# Patient Record
Sex: MALE | Race: BLACK OR AFRICAN AMERICAN | NOT HISPANIC OR LATINO | URBAN - NONMETROPOLITAN AREA
[De-identification: names, ages, dates, MRNs, and addresses within clinical notes are randomized per-mention and may not be internally consistent; named-entity substitution may affect disease eponyms.]

---

## 2018-02-08 ENCOUNTER — OFFICE VISIT CONVERTED (OUTPATIENT)
Dept: FAMILY MEDICINE CLINIC | Age: 33
End: 2018-02-08
Attending: NURSE PRACTITIONER

## 2018-03-19 ENCOUNTER — OFFICE VISIT CONVERTED (OUTPATIENT)
Dept: FAMILY MEDICINE CLINIC | Age: 33
End: 2018-03-19
Attending: NURSE PRACTITIONER

## 2018-06-05 ENCOUNTER — OFFICE VISIT CONVERTED (OUTPATIENT)
Dept: FAMILY MEDICINE CLINIC | Age: 33
End: 2018-06-05
Attending: NURSE PRACTITIONER

## 2018-09-13 ENCOUNTER — OFFICE VISIT CONVERTED (OUTPATIENT)
Dept: FAMILY MEDICINE CLINIC | Age: 33
End: 2018-09-13
Attending: NURSE PRACTITIONER

## 2019-01-03 ENCOUNTER — OFFICE VISIT CONVERTED (OUTPATIENT)
Dept: FAMILY MEDICINE CLINIC | Age: 34
End: 2019-01-03
Attending: NURSE PRACTITIONER

## 2019-07-17 ENCOUNTER — HOSPITAL ENCOUNTER (OUTPATIENT)
Dept: OTHER | Facility: HOSPITAL | Age: 34
Discharge: HOME | End: 2019-07-17

## 2019-07-17 ENCOUNTER — OFFICE VISIT CONVERTED (OUTPATIENT)
Dept: FAMILY MEDICINE CLINIC | Age: 34
End: 2019-07-17
Attending: NURSE PRACTITIONER

## 2019-07-17 LAB
ALBUMIN SERPL-MCNC: 4.4 G/DL (ref 3.5–5)
ALBUMIN/GLOB SERPL: 1.2 {RATIO} (ref 1.4–2.6)
ALP SERPL-CCNC: 71 U/L (ref 53–128)
ALT SERPL-CCNC: 14 U/L (ref 10–40)
ANION GAP SERPL CALC-SCNC: 19 MMOL/L (ref 8–19)
AST SERPL-CCNC: 15 U/L (ref 15–50)
BASOPHILS # BLD MANUAL: 0.03 10*3/UL (ref 0–0.2)
BASOPHILS NFR BLD MANUAL: 0.6 % (ref 0–3)
BILIRUB SERPL-MCNC: 0.63 MG/DL (ref 0.2–1.3)
BUN SERPL-MCNC: 14 MG/DL (ref 5–25)
BUN/CREAT SERPL: 12 {RATIO} (ref 6–20)
CALCIUM SERPL-MCNC: 9.3 MG/DL (ref 8.7–10.4)
CHLORIDE SERPL-SCNC: 100 MMOL/L (ref 99–111)
CHOLEST SERPL-MCNC: 137 MG/DL (ref 107–200)
CHOLEST/HDLC SERPL: 4.6 {RATIO} (ref 3–6)
CONV CO2: 26 MMOL/L (ref 22–32)
CONV TOTAL PROTEIN: 8.2 G/DL (ref 6.3–8.2)
CREAT UR-MCNC: 1.17 MG/DL (ref 0.7–1.2)
DEPRECATED RDW RBC AUTO: 36.9 FL
EOSINOPHIL # BLD MANUAL: 0.09 10*3/UL (ref 0–0.7)
EOSINOPHIL NFR BLD MANUAL: 1.7 % (ref 0–7)
ERYTHROCYTE [DISTWIDTH] IN BLOOD BY AUTOMATED COUNT: 11.7 % (ref 11.5–14.5)
GFR SERPLBLD BASED ON 1.73 SQ M-ARVRAT: >60 ML/MIN/{1.73_M2}
GLOBULIN UR ELPH-MCNC: 3.8 G/DL (ref 2–3.5)
GLUCOSE SERPL-MCNC: 121 MG/DL (ref 70–99)
GRANS (ABSOLUTE): 2.5 10*3/UL (ref 2–8)
GRANS: 46.3 % (ref 30–85)
HBA1C MFR BLD: 16.9 G/DL (ref 14–18)
HCT VFR BLD AUTO: 50.3 % (ref 42–52)
HDLC SERPL-MCNC: 30 MG/DL (ref 40–60)
IMM GRANULOCYTES # BLD: 0 10*3/UL (ref 0–0.54)
IMM GRANULOCYTES NFR BLD: 0 % (ref 0–0.43)
LDLC SERPL CALC-MCNC: 91 MG/DL (ref 70–100)
LYMPHOCYTES # BLD MANUAL: 2.46 10*3/UL (ref 1–5)
LYMPHOCYTES NFR BLD MANUAL: 5.8 % (ref 3–10)
MCH RBC QN AUTO: 28.9 PG (ref 27–31)
MCHC RBC AUTO-ENTMCNC: 33.6 G/DL (ref 33–37)
MCV RBC AUTO: 86 FL (ref 80–96)
MONOCYTES # BLD AUTO: 0.31 10*3/UL (ref 0.2–1.2)
OSMOLALITY SERPL CALC.SUM OF ELEC: 294 MOSM/KG (ref 273–304)
PLATELET # BLD AUTO: 312 10*3/UL (ref 130–400)
PMV BLD AUTO: 10.3 FL (ref 7.4–10.4)
POTASSIUM SERPL-SCNC: 3.7 MMOL/L (ref 3.5–5.3)
RBC # BLD AUTO: 5.85 10*6/UL (ref 4.7–6.1)
SODIUM SERPL-SCNC: 141 MMOL/L (ref 135–147)
TRIGL SERPL-MCNC: 78 MG/DL (ref 40–150)
TSH SERPL-ACNC: 1.04 M[IU]/L (ref 0.27–4.2)
VARIANT LYMPHS NFR BLD MANUAL: 45.6 % (ref 20–45)
VLDLC SERPL-MCNC: 16 MG/DL (ref 5–37)
WBC # BLD AUTO: 5.39 10*3/UL (ref 4.8–10.8)

## 2019-11-06 ENCOUNTER — OFFICE VISIT CONVERTED (OUTPATIENT)
Dept: CARDIOLOGY | Facility: CLINIC | Age: 34
End: 2019-11-06
Attending: INTERNAL MEDICINE

## 2020-01-14 ENCOUNTER — OFFICE VISIT CONVERTED (OUTPATIENT)
Dept: FAMILY MEDICINE CLINIC | Age: 35
End: 2020-01-14
Attending: FAMILY MEDICINE

## 2021-05-15 VITALS
HEART RATE: 81 BPM | DIASTOLIC BLOOD PRESSURE: 111 MMHG | WEIGHT: 241 LBS | SYSTOLIC BLOOD PRESSURE: 173 MMHG | BODY MASS INDEX: 33.74 KG/M2 | HEIGHT: 71 IN

## 2021-05-18 NOTE — PROGRESS NOTES
Obed Gil 1985     Office/Outpatient Visit    Visit Date: Wed, Jul 17, 2019 08:33 am    Provider: Mehreen Can N.P. (Assistant: Sandhya Odell LPN)    Location: [facility]        Electronically signed by Mehreen Can N.P. on  07/22/2019 02:21:14 PM                             SUBJECTIVE:        CC:     Mr. Gil is a 33 year old Black or  male.  needs FMLA renewed. Has a skin tag he wants to see about having removed. Has other personal things he needs to discuss and talk about with provider. BP is elevated this am, pt stated he has not had his bp medication yet today.          HPI: Patient also seen by ARNEL Mtz student.         PHQ-9 Depression Screening: Completed form scanned and in chart; Total Score 17 Alcohol Consumption Screening: Completed form scanned and in chart; Total Score 1         Depression with anxiety details; patient presents today for evaluation of depression.  Patient states he has increased stressed in his life currently with a recent break-up and his ill father. He states he has lost interest in his daily activities, has a hard time going to work and functioning. He feels as though he has racing thoughts and is unable to focus at times. He denies thoughts of harming himself or suicide.         Additionally, he presents with history of hypertension.  patient here for follow-up on his hypertension.  He states he has been taking his medication this week every day and has noticed an improvement in his blood pressure. Reports frequent headaches, denies blurry vision, swelling in ankles and dizziness.     ROS:     CONSTITUTIONAL:  Negative for chills, fatigue and fever.      CARDIOVASCULAR:  Negative for chest pain, orthopnea, paroxysmal nocturnal dyspnea and pedal edema.      RESPIRATORY:  Negative for dyspnea and cough.      GASTROINTESTINAL:  Negative for abdominal pain, heartburn, constipation, diarrhea, and stool changes.       NEUROLOGICAL:  Positive for headaches.   Negative for dizziness, memory loss or vertigo.      PSYCHIATRIC:  Negative for anxiety and depression.          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          Alcohol: Frequency: Socially When he drinks, the average quantity of alcohol is 2 drinks.          Substance Abuse History:     Last Reviewed on 1/03/2019 12:14 PM by Mehreen Can    NEGATIVE         Mental Health History:     Last Reviewed on 1/03/2019 12:14 PM by Mehreen Can    NEGATIVE         Communicable Diseases (eg STDs):     Last Reviewed on 1/03/2019 12:14 PM by Mehreen Can            Current Problems:     Last Reviewed on 1/03/2019 12:14 PM by Mehreen Can    Stress     Hyperlipidemia     Hypertension     ADD     Obstructive sleep apnea         Immunizations:     None        Allergies:     Last Reviewed on 1/03/2019 12:14 PM by Mehreen Can    Lisinopril: cough        Current Medications:     Last Reviewed on 1/03/2019 12:14 PM by Mehreen Can    Amlodipine  5mg Tablet 1 tab daily     Chlorthalidone 25mg Tablet Take 1 tablet(s) by mouth daily     Losartan 25mg Tablet 1/2 tab daily         OBJECTIVE:        Vitals:         Current: 7/17/2019 8:41:39 AM    Ht:  5 ft, 10.5 in;  Wt: 240 lbs;  BMI:  33.9    T: 97.7 F (oral);  BP: 149/95 mm Hg (left arm, sitting);  P: 71 bpm (left arm (BP Cuff), sitting);  sCr: 1.16 mg/dL;  GFR: 98.06        Exams:     PHYSICAL EXAM:     GENERAL: Vitals recorded well developed, well nourished;  well groomed;  no apparent distress;     EYES: lids and lacrimal system are normal in appearance; extraocular movements intact; conjunctiva and cornea are normal; PERRLA;     E/N/T:  normal EACs, TMs, nasal/oral mucosa, teeth, gingiva, and oropharynx;     NECK:  supple, full ROM; no thyromegaly; no carotid bruits;     RESPIRATORY: normal respiratory rate and pattern with no distress; normal breath sounds with no rales, rhonchi, wheezes or rubs;     CARDIOVASCULAR: normal rate; rhythm is regular;  normal S1; normal S2; no systolic murmur; no cyanosis; no edema;     GASTROINTESTINAL: nontender, nondistended; no hepatosplenomegaly or masses; no bruits;     SKIN:  no significant rashes or lesions; no suspicious moles;     MUSCULOSKELETAL:  Normal range of motion, strength and tone;     NEUROLOGICAL:  cranial nerves, motor and sensory function, reflexes, gait and coordination are all intact;     PSYCHIATRIC:  appropriate affect and demeanor; normal speech pattern; grossly normal memory;         ASSESSMENT:           V79.0   Z13.31  Screening for depression              DDx:     300.4   F34.1  Depression with anxiety              DDx:     401.1   I10  Hypertension              DDx:         ORDERS:         Meds Prescribed:       Prozac (Fluoxetine) 20mg Capsules 1 capsule daily  #30 (Thirty) capsule(s) Refills: 0         Lab Orders:       [id number]  [facility] PHYSICAL: CMP, CBC, TSH, LIPID: [id number], [id number], [id number], [id number]  (Send-Out)         APPTO  Appointment need  (In-House)           Procedures Ordered:       REFER  Referral to Specialist or Other Facility  (Send-Out)                   PLAN:          Depression with anxiety         REFERRALS:  Referral initiated to a psychiatrist ( Juliet Coreas ).       FOLLOW-UP: Advised to call if there is no improvement.            Prescriptions:       Prozac (Fluoxetine) 20mg Capsules 1 capsule daily  #30 (Thirty) capsule(s) Refills: 0           Orders:       REFER  Referral to Specialist or Other Facility  (Send-Out)            Hypertension     LABORATORY:  Labs ordered to be performed today include PHYSICAL PANEL; CMP, CBC, TSH, LIPID.      FOLLOW-UP: Schedule a follow-up visit in 6 months.            Orders:       [id number]  [facility] PHYSICAL: CMP, CBC, TSH, LIPID: [id number], [id number], [id number], [id number]  (Send-Out)         APPTO  Appointment need  (In-House)               Patient Recommendations:        For  Hypertension:     Schedule a follow-up visit in 6 months.              CHARGE CAPTURE:           Primary Diagnosis:     V79.0 Screening for depression            Z13.31    Encounter for screening for depression              Orders:          [id number]   Office/outpatient visit; established patient, level 3  (In-House)           300.4 Depression with anxiety            F34.1    Dysthymic disorder    401.1 Hypertension            I10    Essential (primary) hypertension              Orders:          APPTO   Appointment need  (In-House)

## 2021-05-18 NOTE — PROGRESS NOTES
Obed Gil 1985     Office/Outpatient Visit    Visit Date: Mon, Mar 19, 2018 06:35 pm    Provider: Chung Montaño N.P. (Assistant: Whitney Coreas MA)    Location: [facility]        Electronically signed by Chung Montaño N.P. on  03/20/2018 09:48:49 AM                             SUBJECTIVE:        CC:     Mr. Gil is a 32 year old Black or  male.  This is a follow-up visit.  pt states bp been running high, pt complains of headache, pt states he has been out of bp med for a couple days         HPI:         Patient presents with hypertension.  This was first diagnosed 10 yrs years ago.  Current nonpharmacologic treatment includes exercise.  His current cardiac medication regimen includes a diuretic and a calcium channel blocker.  He has not kept a blood pressure diary, but states that pressures have been too high.  He is tolerating the medication well without side effects.  Compliance with treatment has been fair; he does not follow a diet and exercise regimen.      ROS:     CONSTITUTIONAL:  Negative for chills, fatigue, fever and weight change.      CARDIOVASCULAR:  Positive for Been out of medication for about 2 days, BP normally 150 -160s.   Negative for chest pain, orthopnea, paroxysmal nocturnal dyspnea or pedal edema.      RESPIRATORY:  Negative for dyspnea and cough.      GASTROINTESTINAL:  Negative for abdominal pain, heartburn, constipation, diarrhea, and stool changes.      PSYCHIATRIC:  Negative for anxiety and depression.          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          Alcohol: Frequency: Socially When he drinks, the average quantity of alcohol is 2 drinks.              Current Problems:     Last Reviewed on 3/19/2018 07:05 PM by Chung Montaño    Hypertension     ADD     Obstructive sleep apnea     Cough         Immunizations:     None        Allergies:     Last Reviewed on 3/19/2018 07:05 PM by Chung Montaño    Lisinopril: cough        Current Medications:     Last Reviewed on 3/19/2018 07:05 PM by Chung Montaño    Chlorthalidone 25mg Tablet Take 1 tablet(s) by mouth daily     Amlodipine  2.5mg Tablet 1 tab daily         OBJECTIVE:        Vitals:         Current: 3/19/2018 6:37:28 PM    Ht:  5 ft, 10.5 in;  Wt: 249.5 lbs;  BMI: 35.3    T: 97.3 F (oral);  BP: 161/111 mm Hg (left arm, sitting);  P: 75 bpm (left arm (BP Cuff), sitting);  sCr: 1.16 mg/dL;  GFR: 100.61        Repeat:     6:47:33 PM     BP:   154/96mm Hg (right arm, sitting)         Exams:     PHYSICAL EXAM:     GENERAL: Vitals recorded well developed, well nourished;  well groomed;  no apparent distress;     RESPIRATORY: normal respiratory rate and pattern with no distress; normal breath sounds with no rales, rhonchi, wheezes or rubs;     CARDIOVASCULAR: normal rate; rhythm is regular;  normal S1; normal S2; no systolic murmur; no cyanosis; no edema;     GASTROINTESTINAL: nontender, nondistended; no hepatosplenomegaly or masses; no bruits;         ASSESSMENT:           401.1   I10  Hypertension              DDx:     272.4   E78.4  Hyperlipidemia              DDx:         ORDERS:         Meds Prescribed:       Refill of: Chlorthalidone 25mg Tablet Take 1 tablet(s) by mouth daily  #30 (Thirty)  tablet(s) Refills: 1       Refill of: Amlodipine  5mg Tablet 1 tab daily  #30 (Thirty) tablet(s) Refills: 1       Losartan 25mg Tablet 1/2 tab daily  #30 (Thirty) tablet(s) Refills: 1         Lab Orders:       [id number]  [facility] CBC with 3 part diff  (Send-Out)         [id number]  COMP [facility] Comp. Metabolic Panel  (Send-Out)         30690  TSH - [facility] TSH  (Send-Out)         [id number]  [facility] Microablbumin, quantitative  (Send-Out)         32177  [facility] Lipid Panel  (Send-Out)                   PLAN:          Hypertension     LABORATORY:  Labs ordered to be performed today include CBC, Comprehensive metabolic panel, Microalbumin, and TSH.      RECOMMENDATIONS given include: Call 5 resting BPs in the next week call them into the office.      FOLLOW-UP: Schedule a follow-up appointment in 2 weeks..            Prescriptions: This is a increase in Amlodipine to 5mg and addition of Losartan 12.5mg orally daily dmt       Refill of: Chlorthalidone 25mg Tablet Take 1 tablet(s) by mouth daily  #30 (Thirty) tablet(s) Refills: 1       Refill of: Amlodipine  5mg Tablet 1 tab daily  #30 (Thirty) tablet(s) Refills: 1       Losartan 25mg Tablet 1/2 tab daily  #30 (Thirty) tablet(s) Refills: 1           Orders:       [id number]  [facility] CBC with 3 part diff  (Send-Out)         80209  [facility] Comp. Metabolic Panel  (Send-Out)         [id number]  TSH [facility] TSH  (Send-Out)         79479  [facility] Microablbumin, quantitative  (Send-Out)            Hyperlipidemia     LABORATORY:  Labs ordered to be performed today include lipid panel.            Orders:       [id number]  [facility] Lipid Panel  (Send-Out)               Patient Recommendations:        For  Hypertension:     Schedule a follow-up visit in 2 weeks.                APPOINTMENT INFORMATION:        Monday Tuesday Wednesday Thursday Friday Saturday Sunday            Time:___________________AM  PM   Date:_____________________             CHARGE CAPTURE:           Primary  Diagnosis:     401.1 Hypertension            I10    Essential (primary) hypertension              Orders:          [id number]   Office/outpatient visit; established patient, level 4  (In-House)           272.4 Hyperlipidemia            E78.4    Other hyperlipidemia

## 2021-05-18 NOTE — PROGRESS NOTES
Obed Gil 1985     Office/Outpatient Visit    Visit Date: Thu, Sep 13, 2018 11:23 am    Provider: Janay Laurent N.P. (Assistant: Annamaria Adams MA)    Location: [facility]        Electronically signed by Janay Laurent N.P. on  09/14/2018 03:01:53 PM                             SUBJECTIVE:        CC:     Mr. Gil is a 32 year old Black or  male.  He presents with sore throat, swollen neck & Right ear pain & weakness.  (PT HAS BEEN TAKING 25MG, THE WHOLE TAB, PT THOUGHT THAT WAS HOW IT WAS DIRECTED & NOW NEEDS A REFILL)         HPI:         He complains of a sore throat.  This has been noted for the past two days.  The discomfort occurs constantly.  He denies exposure to ill contacts.  He has already tried to relieve the symptoms with ibuprofen.      ROS:     CONSTITUTIONAL:  Positive for fatigue.   Negative for fever.      E/N/T:  Positive for sore throat.   Negative for ear pain or frequent rhinorrhea.      CARDIOVASCULAR:  Negative for chest pain, palpitations, tachycardia, orthopnea, and edema.      RESPIRATORY:  Negative for cough, dyspnea, and hemoptysis.      GASTROINTESTINAL:  Negative for abdominal pain, heartburn, constipation, diarrhea, and stool changes.      MUSCULOSKELETAL:  Negative for arthralgias, back pain, and myalgias.      NEUROLOGICAL:  Negative for dizziness, headaches, paresthesias, and weakness.          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          Alcohol: Frequency: Socially When he drinks, the average quantity of alcohol is 2 drinks.          Substance Abuse History:     Last Reviewed on 3/19/2018 07:05 PM by Chung Montaño    NEGATIVE         Mental Health History:     Last Reviewed on 3/19/2018 07:05 PM by Chung Montaño    NEGATIVE         Communicable Diseases (eg STDs):     Last Reviewed on 3/19/2018 07:05 PM by Chung Montaño            Current Problems:     Last Reviewed on 3/19/2018 07:05 PM by Chung Montaño    Hyperlipidemia     Hypertension     ADD     Obstructive sleep apnea         Immunizations:     None        Allergies:     Last Reviewed on 6/05/2018 04:14 PM by Annamaria Adams    Lisinopril: cough        Current Medications:     Last Reviewed on 6/05/2018 04:16 PM by Annamaria Adams    Amlodipine  5mg Tablet 1 tab daily     Chlorthalidone 25mg Tablet Take 1 tablet(s) by mouth daily     Losartan 25mg Tablet 1/2 tab daily         OBJECTIVE:        Vitals:         Current: 9/13/2018 11:29:54 AM    Ht:  5 ft, 10.5 in;  Wt: 254.6 lbs;  BMI: 36.0    T: 98.2 F (oral);  BP: 146/104 mm Hg (left arm, sitting);  P: 92 bpm (left arm (BP Cuff), sitting);  sCr: 1.16 mg/dL;  GFR: 101.48        Repeat:     11:40:58 AM     BP:   156/98mm Hg (right arm, sitting)         Exams:     PHYSICAL EXAM:     GENERAL:  well developed and nourished; appropriately groomed; in no apparent distress;     E/N/T: EARS: bilateral TMs are normal;  NOSE: normal nasal mucosa; OROPHARYNX: posterior pharynx shows 2+ tonsils, a posterior pharyngeal exudate, and erythematous anterior tonsillar pillars;     RESPIRATORY: normal respiratory rate and pattern with no distress; normal breath sounds with no rales, rhonchi, wheezes or rubs;     CARDIOVASCULAR: normal rate;  rhythm is regular;     LYMPHATIC: bilateral anterior cervical nodes ( 0.5 cm in size, tender, mobile );     MUSCULOSKELETAL:  Normal range of motion, strength and tone;     NEUROLOGIC: mental status: alert and oriented x 3; GROSSLY INTACT     PSYCHIATRIC:  appropriate affect and demeanor; normal speech pattern; grossly normal memory;         Lab/Test Results:             Rapid Strep Screen:  Positive (09/13/2018),     Performed by::  [facility] (09/13/2018),             ASSESSMENT           034.0   J02.0  Streptococcal pharyngitis              DDx:     401.1   I10  Hypertension              DDx:         ORDERS:         Meds Prescribed:       Amoxicillin 500mg Capsules 2 caps bid x 10 days  #40 (Forty) capsule(s) Refills: 0         Lab Orders:       [id number]  Group A Streptococcus detection by immunoassay with direct optical observation  (In-House)                   PLAN:          Streptococcal pharyngitis         RECOMMENDATIONS given include: rest, increase oral fluid intake, reduce fever with acetaminophen or ibuprofen, Salt water gargle, and get new toothbrush in a few days.  follow up if not improving..            Prescriptions:       Amoxicillin 500mg Capsules 2 caps bid x 10 days  #40 (Forty) capsule(s) Refills: 0           Orders:       [id number]  Group A Streptococcus detection by immunoassay with direct optical observation  (In-House)            Hypertension         on amlodipine, chlorthalidone. and losartan.  bp high today.  he does not feel as if his bp has been elevated.  come for free bp check in one week.  losartan refill request sent to PCP.              Patient Recommendations:        For  Streptococcal pharyngitis:     Get plenty of rest. Increase oral fluid intake. Reduce fever as needed with acetaminophen (Tylenol) or ibuprofen (Motrin, Advil, etc.). Make salt water solution by combining 1/2 to 1 teaspoons of table salt with 8 ounces of warm water.  Gargle for 10 seconds and spit out salt water.  Repeat  several times a day as needed.              CHARGE CAPTURE           **Please note: ICD descriptions below are intended for billing purposes only and may not represent clinical diagnoses**        Primary Diagnosis:         034.0 Streptococcal pharyngitis            J02.0    Streptococcal pharyngitis              Orders:          [id number]   Office/outpatient visit; established patient, level 3  (In-House)             [id number]   Group A Streptococcus detection by immunoassay with direct optical observation  (In-House)           401.1 Hypertension            I10    Essential (primary) hypertension

## 2021-05-18 NOTE — PROGRESS NOTES
Obed Gil 1985     Office/Outpatient Visit    Visit Date: Tue, Jun 5, 2018 04:10 pm    Provider: Mehreen Cna N.P. (Assistant: Annamaria Adams MA)    Location: [facility]        Electronically signed by Mehreen Can N.P. on  06/09/2018 09:09:41 AM                             SUBJECTIVE:        CC: Pt also seen by LASHAWN Jimenez NP Student     Mr. Gil is a 32 year old Black or  male.  He presents with elevated bp, left foot swelling, wants to discuss meds.  pt not taking any bp meds, out of all meds         HPI:         Patient presents with hypertension.  His current cardiac medication regimen includes Pt reports losing medication-not currently taking.  He has not kept a blood pressure diary, but states that pressures have been too high.          Concerning foot pain, other details: Patient presents with bilateral pedal edema;  Reports being on feet frequently, lack of elevating lower extremities.          Patient is having problems with his C-Pap machine;  Needs new machine     ROS:     CONSTITUTIONAL:  Positive for unintentional weight gain.   Negative for chills, fatigue or fever.      CARDIOVASCULAR:  Positive for pedal edema ( moderate ).   Negative for chest pain, orthopnea or paroxysmal nocturnal dyspnea.      RESPIRATORY:  Negative for dyspnea and cough.      GASTROINTESTINAL:  Negative for abdominal pain, heartburn, constipation, diarrhea, and stool changes.      PSYCHIATRIC:  Negative for anxiety and depression.          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          Alcohol: Frequency: Socially When he drinks, the average quantity of alcohol is 2 drinks.          Substance Abuse History:     Last Reviewed on 3/19/2018 07:05 PM by Chung Montaño    NEGATIVE         Mental Health History:     Last Reviewed on 3/19/2018 07:05 PM by Chung Montaño    NEGATIVE         Communicable Diseases (eg STDs):     Last Reviewed on 3/19/2018 07:05 PM by Chung Montaño            Current Problems:     Last Reviewed on 3/19/2018 07:05 PM by Chung Montaño    Hyperlipidemia     Hypertension     ADD     Obstructive sleep apnea     Foot pain         Immunizations:     None        Allergies:     Last Reviewed on 6/05/2018 04:14 PM by Annamaria Adams    Lisinopril: cough        Current Medications:     Last Reviewed on 6/05/2018 04:16 PM by Annamaria Adams    Amlodipine  5mg Tablet 1 tab daily     Chlorthalidone 25mg Tablet Take 1 tablet(s) by mouth daily     Losartan 25mg Tablet 1/2 tab daily         OBJECTIVE:        Vitals:         Current: 6/5/2018 4:13:31 PM    Ht:  5 ft, 10.5 in;  Wt: 259.2 lbs;  BMI: 36.7    T: 98.1 F (oral);  BP: 188/131 mm Hg (right arm, sitting);  P: 79 bpm (left arm (BP Cuff), sitting);  sCr: 1.16 mg/dL;  GFR: 102.26        Repeat:     4:20:45 PM     BP:   196/122mm Hg (left arm, sitting)         Exams:     PHYSICAL EXAM:     GENERAL: Vitals recorded well developed, well nourished;  well groomed;  no apparent distress;     RESPIRATORY: normal respiratory rate and pattern with no distress; normal breath sounds with no rales, rhonchi, wheezes or rubs;     CARDIOVASCULAR: normal rate; rhythm is regular;  normal S1; normal S2; no systolic murmur; no  cyanosis; 2+ pedal edema;     GASTROINTESTINAL: nontender, nondistended; no hepatosplenomegaly or masses; no bruits;     NEUROLOGICAL:  cranial nerves, motor and sensory function, reflexes, gait and coordination are all intact;     PSYCHIATRIC:  appropriate affect and demeanor; normal speech pattern; grossly normal memory;         ASSESSMENT:           401.1   I10  Hypertension              DDx:     729.5   M79.671  Foot pain              DDx:     327.23   G47.33  Obstructive sleep apnea              DDx:         ORDERS:         Meds Prescribed:       Refill of: Amlodipine  5mg Tablet 1 tab daily  #90 (Ninety) tablet(s) Refills: 1       Refill of: Chlorthalidone 25mg Tablet Take 1 tablet(s) by mouth daily  #90 (Ninety) tablet(s) Refills: 1       Refill of: Losartan 25mg Tablet 1/2 tab daily  #90 (Ninety) tablet(s) Refills: 1         Lab Orders:       [id number]  [facility] CBC with 3 part diff  (Send-Out)         [id number]  [facility] Comp. Metabolic Panel  (Send-Out)         06323  [facility] Lipid Panel  (Send-Out)           Procedures Ordered:       REFER  Referral to Specialist or Other Facility  (Send-Out)                   PLAN:          Hypertension     LABORATORY:  Labs ordered to be performed today include CBC, Comprehensive metabolic panel, and lipid panel.      FOLLOW-UP:.   Chronic visit follow up           Prescriptions:       Refill of: Amlodipine  5mg Tablet 1 tab daily  #90 (Ninety) tablet(s) Refills: 1       Refill of: Chlorthalidone 25mg Tablet Take 1 tablet(s) by mouth daily  #90 (Ninety) tablet(s) Refills: 1       Refill of: Losartan 25mg Tablet 1/2 tab daily  #90 (Ninety) tablet(s) Refills: 1           Orders:       55012  [facility] CBC with 3 part diff  (Send-Out)         94397  [facility] Comp. Metabolic Panel  (Send-Out)         58057  [facility] Lipid Panel  (Send-Out)            Foot pain Instructed elevate lower extremities;  use pressure socks;  take diuretic as prescribed          Obstructive  sleep apnea         REFERRALS:  Referral initiated to [facility].            Orders:       REFER  Referral to Specialist or Other Facility  (Send-Out)             Patient Education Handouts:       Sleep Apnea              Patient Recommendations:        For  Hypertension:                     APPOINTMENT INFORMATION:        Monday Tuesday Wednesday Thursday Friday Saturday Sunday            Time:___________________AM  PM   Date:_____________________             CHARGE CAPTURE:           Primary Diagnosis:     401.1 Hypertension            I10    Essential (primary) hypertension              Orders:          [id number]   Office/outpatient visit; established patient, level 4  (In-House)           729.5 Foot pain            M79.671    Pain in right foot    327.23 Obstructive sleep apnea            G47.33    Obstructive sleep apnea (adult) (pediatric)        ADDENDUMS:      ____________________________________    Addendum: 06/06/2018 02:41 PM - Meghan Arechiga         Visit Note Faxed to:        [facility] ; Number [phone]

## 2021-05-18 NOTE — PROGRESS NOTES
Obed Gil  1985     Office/Outpatient Visit    Visit Date: Tue, Jan 14, 2020 03:20 pm    Provider: Bjorn Berry MD (Assistant: Whitney Coreas MA)    Location: [facility]        Electronically signed by Bjorn Berry MD on  01/14/2020 08:48:41 PM                             Subjective:        CC: Mr. Gil is a 34 year old Black or  male.  establish care with Dr Berry, discuss FMLA;pt says he hasnt been taking any of his meds         HPI:           PHQ-9 Depression Screening: Completed form scanned and in chart; Total Score 8       BP today is 199/132 with a HR of 77. Recheck is 194/118. He is prescribed losartan 25 mg 1/2 tab qd, chlorthalidone 25 mg qd, and amlodipine 5 mg qd. He has been out of his meds for several months although he did have a 3 day supply about 1.5 weeks ago. He has headache and shortness of breath. He is hoping to get FMLA for HTN.      Pt has positive FHx of DM 2.     ROS:     CONSTITUTIONAL:  Positive for fatigue and generally feeling ill.   Negative for fever.      EYES:  Negative for blurred vision.      E/N/T:  Negative for diminished hearing and nasal congestion.      CARDIOVASCULAR:  Negative for chest pain and palpitations.      RESPIRATORY:  Negative for recent cough and dyspnea.      GASTROINTESTINAL:  Negative for abdominal pain, constipation, diarrhea, nausea and vomiting.      MUSCULOSKELETAL:  Negative for arthralgias and myalgias.      NEUROLOGICAL:  Positive for headaches.   Negative for paresthesias or weakness.      PSYCHIATRIC:  Negative for anxiety, depression and sleep disturbance.          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          Alcohol: Frequency: Socially When he drinks, the average quantity of alcohol is 2 drinks.          Substance Abuse History:     Last Reviewed on 1/14/2020 08:35 PM by Bjorn Berry    NEGATIVE         Mental Health History:     Last Reviewed on 1/14/2020 08:35 PM by Bjorn Berry    NEGATIVE         Communicable Diseases (eg STDs):     Last Reviewed on 1/14/2020 08:35 PM by Bjorn Berry        Current Problems:     Last Reviewed on 1/14/2020 08:35 PM by Bjorn Berry    Obstructive sleep apnea (adult) (pediatric)    Essential (primary) hypertension    Other hyperlipidemia    Dysthymic disorder    Encounter for screening for diabetes mellitus    Encounter for screening for depression        Immunizations:     None        Allergies:     Last Reviewed on 1/14/2020 08:35 PM by Bjorn Berry    Lisinopril: cough         Current Medications:     Last Reviewed on 1/14/2020 08:35 PM by Bjorn Berry    chlorthalidone 25 mg oral tablet [TAKE 1 TABLET BY MOUTH DAILY]    Losartan 25mg Tablet [1/2 tab daily]    Amlodipine  5mg Tablet [1 tab daily]        Objective:        Vitals:         Current: 1/14/2020 3:24:12 PM    Ht:  5 ft, 10.5 in;  Wt: 250.6 lbs;  BMI: 35.4T: 98 F (oral);  BP: 199/132 mm Hg (left arm, sitting);  P: 77 bpm (left arm (BP Cuff), sitting);  sCr: 1.17 mg/dL;  GFR: 98.11        Repeat:     3:31:54 PM  BP:   194/118mm Hg (left arm, sitting)     Exams:     PHYSICAL EXAM:     GENERAL: Vitals recorded well developed, well nourished;  well  groomed;  no apparent distress, tired-appearing;     EYES: extraocular movements intact; conjunctiva and cornea are normal; PERRLA;     E/N/T: OROPHARYNX:  normal mucosa, dentition, gingiva, and posterior pharynx;     NECK: range of motion is normal; thyroid exam reveals not enlarged;     RESPIRATORY: normal respiratory rate and pattern with no distress; normal breath sounds with no rales, rhonchi, wheezes or rubs;     CARDIOVASCULAR: normal rate; rhythm is regular;  no systolic murmur; no edema;     GASTROINTESTINAL: nontender; normal bowel sounds;     LYMPHATIC: no enlargement of cervical or facial nodes;     MUSCULOSKELETAL: normal gait; normal overall tone     NEUROLOGIC: mental status: alert and oriented x 3; reflexes: knee jerks: 2+;     PSYCHIATRIC: affect/demeanor: agitated, depressed;  normal psychomotor function; normal speech pattern; normal thought and perception;         Assessment:         Z13.31   Encounter for screening for depression       I10   Essential (primary) hypertension       Z13.1   Encounter for screening for diabetes mellitus           ORDERS:         Meds Prescribed:       [Refilled] amLODIPine 5 mg oral tablet [1 tab daily], #30 (thirty) tablets, Refills: 0 (zero)       [Refilled] losartan 25 mg oral tablet [1 tab daily], #30 (thirty) tablets, Refills: 0 (zero)       [Refilled] chlorthalidone 25 mg oral tablet [TAKE 1 TABLET BY MOUTH DAILY], #30 (thirty) tablets, Refills: 0 (zero)         Lab Orders:       FUTURE  Future order to be done at patients convenience  (Send-Out)            [id number]  [facility] PHYSICAL: CMP, CBC, TSH, LIPID: [id number], [id number], [id number], [id number]  (Send-Out)            FUTURE  Future order to be done at patients convenience  (Send-Out)            [id number]  [facility]  (Send-Out)              Other Orders:         Depression screen positive and follow up plan documented  (In-House)                      Plan:         Encounter for screening for depression     [facility] PHQ-9 Depression Screening: Completed form scanned and in chart; Total Score 8 Positive Depression Screen: Suicide Risk Assessment completed--denies suicidal/homicidal ideation           Orders:         Depression screen positive and follow up plan documented  (In-House)              Essential (primary) hypertensionBP is advised BP is extremely elevated and this needs to be addressed on an on-going basis in order to avoid effects of HTN like stroke, MI, and CKD. Pt is hoping for FMLA paperwork to be completed based on the Dx of HTN and he is advised this is an unusual request and not a typical reason for FMLA in my opinion. Additionally, this is a preventable and treatable condition simply by taking his BP meds and routine f/u with PCP. Will refill losartan 25 mg 1/2 tab qd, chlorthalidone 25 mg qd, and amlodipine 5 mg qd and he is likely to need to be increased. Basic labs and screening for DM 2 os ordered although patient seems to indicate that he is not interested in completing these labs if FMLA paperwork is not completed. ER precautions are given and pt advised that headache and shortness of breath may be a sign of adverse effect of extremely high BP and that going to the ER would be the safest option to r/o hypertensive emergency.        FOLLOW-UP TESTING #1: FOLLOW-UP LABORATORY:  Labs to be scheduled in the future include PHYSICAL PANEL; CMP, CBC, TSH, LIPID.   Schedule a follow-up visit in 1 month.            Prescriptions:       [Refilled] amLODIPine 5 mg oral tablet [1 tab daily], #30 (thirty) tablets, Refills: 0 (zero)       [Refilled] losartan 25 mg oral tablet [1 tab daily], #30 (thirty) tablets, Refills: 0 (zero)       [Refilled] chlorthalidone 25 mg oral tablet [TAKE 1 TABLET BY MOUTH DAILY], #30 (thirty) tablets, Refills: 0 (zero)           Orders:       FUTURE  Future order to be done at patients convenience  (Send-Out)            [id number]  [facility] PHYSICAL: CMP, CBC, TSH, LIPID: [id number],  [id number], 54055, 14927  (Send-Out)              Encounter for screening for diabetes mellitus        FOLLOW-UP TESTING #1: FOLLOW-UP LABORATORY:  Labs to be scheduled in the future include HgbA1C.   Schedule a follow-up visit in 1 month.            Orders:       FUTURE  Future order to be done at patients convenience  (Send-Out)            [address]  [address] Hemoglobin A1C  (Send-Out)                  Patient Recommendations:        For  Essential (primary) hypertension:            The following laboratory testing has been ordered: Schedule the above testing in 1 month.          For  Encounter for screening for diabetes mellitus:            The following laboratory testing has been ordered: HgbA1C Schedule the above testing in 1 month.              Charge Capture:         Primary Diagnosis:     Z13.31  Encounter for screening for depression           Orders:      [id number]  Office/outpatient visit; established patient, level 4  (In-House)              Depression screen positive and follow up plan documented  (In-House)              I10  Essential (primary) hypertension     Z13.1  Encounter for screening for diabetes mellitus

## 2021-05-18 NOTE — PROGRESS NOTES
Obed Gil 1985     Office/Outpatient Visit    Visit Date: Thu, Alberto 3, 2019 11:20 am    Provider: Mehreen Can N.P. (Assistant: Luis Palomares)    Location: [facility]        Electronically signed by Mehreen Can N.P. on  01/03/2019 01:50:55 PM                             SUBJECTIVE:        CC: (hasnt been taking any of his meds)     Mr. Gil is a 33 year old Black or  male.  bp check         HPI: Denies flu shot.         Patient presents with hypertension.  Pt states bp elevated d/t not being on med. He ran out, was told to come get labs and he didn't think  he could get it refilled.          ROSANNE: Pt states not using cpap. States having to turn it back in to insurance. He went to allergy and sleep doctor. They had him in collections. He got that straightened out. States insurance wants him to have another sleep study. He is wanting to get back on track .          Concerning stress, states a lot of stress with his family and dad with dementia. He is now living in [place] with his 7 other children to help take care of him.      ROS:     CONSTITUTIONAL:  Negative for chills, fatigue, fever and weight change.      CARDIOVASCULAR:  Negative for chest pain, orthopnea, paroxysmal nocturnal dyspnea and pedal edema.      RESPIRATORY:  Negative for dyspnea and cough.      GASTROINTESTINAL:  Negative for abdominal pain, heartburn, constipation, diarrhea, and stool changes.      MUSCULOSKELETAL:  Negative for arthralgias, back pain, and myalgias.      NEUROLOGICAL:  Negative for dizziness, headaches, paresthesias, and weakness.      PSYCHIATRIC:  Negative for anxiety and depression.          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          Alcohol: Frequency: Socially When he drinks, the average quantity of alcohol is 2 drinks.          Substance Abuse History:     Last Reviewed on 1/03/2019 12:14 PM by Mehreen Can    NEGATIVE         Mental Health History:     Last Reviewed on 1/03/2019 12:14 PM by Mehreen Can    NEGATIVE         Communicable Diseases (eg STDs):     Last Reviewed on 1/03/2019 12:14 PM by Mehreen Can            Current Problems:     Last Reviewed on 1/03/2019 12:14 PM by Mehreen Can    Hyperlipidemia     Hypertension     ADD     Obstructive sleep apnea         Immunizations:     None        Allergies:     Last Reviewed on 1/03/2019 12:14 PM by Mehreen Can    Lisinopril: cough        Current Medications:     Last Reviewed on 1/03/2019 12:14 PM by Mehreen Can    Amlodipine  5mg Tablet 1 tab daily     Chlorthalidone 25mg Tablet Take 1 tablet(s) by mouth daily     Losartan 25mg Tablet 1/2 tab daily         OBJECTIVE:        Vitals:         Current: 1/3/2019 11:25:42 AM    Ht:  5 ft, 10.5 in;  Wt: 251.6 lbs;  BMI: 35.6    T: 97.8 F (oral);  BP: 207/131 mm Hg (right arm, sitting);  P: 80 bpm (right arm (BP Cuff), sitting);  sCr: 1.16 mg/dL;  GFR: 100.05        Exams:     PHYSICAL EXAM:     GENERAL: Vitals recorded well developed, well nourished;  well groomed;  no apparent distress;     EYES: lids and lacrimal system are normal in appearance; extraocular movements intact; conjunctiva and cornea are normal; PERRLA;     NECK:  supple, full ROM; no  thyromegaly; no carotid bruits;     RESPIRATORY: normal respiratory rate and pattern with no distress; normal breath sounds with no rales, rhonchi, wheezes or rubs;     CARDIOVASCULAR: normal rate; rhythm is regular;  normal S1; normal S2; no systolic murmur; no cyanosis; no edema;     GASTROINTESTINAL: nontender, nondistended; no hepatosplenomegaly or masses; no bruits;     SKIN:  no significant rashes or lesions; no suspicious moles;     MUSCULOSKELETAL:  Normal range of motion, strength and tone;     NEUROLOGICAL:  cranial nerves, motor and sensory function, reflexes, gait and coordination are all intact;     PSYCHIATRIC:  appropriate affect and demeanor; normal speech pattern; grossly normal memory;         ASSESSMENT:           401.1   I10  Hypertension              DDx:     327.23   G47.33  Obstructive sleep apnea              DDx:     300.02   F34.1  Stress              DDx:         ORDERS:         Meds Prescribed:       Refill of: Amlodipine  5mg Tablet 1 tab daily  #90 (Ninety) tablet(s) Refills: 1       Refill of: Chlorthalidone 25mg Tablet Take 1 tablet(s) by mouth daily  #90 (Ninety) tablet(s) Refills: 1       Refill of: Losartan 25mg Tablet 1/2 tab daily  #90 (Ninety) tablet(s) Refills: 1         Lab Orders:       APPTO  Appointment need  (In-House)           Procedures Ordered:       REFER  Referral to Specialist or Other Facility  (Send-Out)                   PLAN:          Hypertension         FOLLOW-UP: Schedule a follow-up visit in 3 months..            Prescriptions:       Refill of: Amlodipine  5mg Tablet 1 tab daily  #90 (Ninety) tablet(s) Refills: 1       Refill of: Chlorthalidone 25mg Tablet Take 1 tablet(s) by mouth daily  #90 (Ninety) tablet(s) Refills: 1       Refill of: Losartan 25mg Tablet 1/2 tab daily  #90 (Ninety) tablet(s) Refills: 1           Orders:       APPTO  Appointment need  (In-House)            Obstructive sleep apnea         REFERRALS:  Referral initiated to [facility]  [place].            Orders:       REFER  Referral to Specialist or Other Facility  (Send-Out)               Patient Recommendations:        For  Hypertension:     Schedule a follow-up visit in 3 months.                APPOINTMENT INFORMATION:        Monday Tuesday Wednesday Thursday Friday Saturday Sunday            Time:___________________AM  PM   Date:_____________________             CHARGE CAPTURE:           Primary Diagnosis:     401.1 Hypertension            I10    Essential (primary) hypertension              Orders:          [id number]   Office/outpatient visit; established patient, level 4  (In-House)             APPTO   Appointment need  (In-House)           327.23 Obstructive sleep apnea            G47.33    Obstructive sleep apnea (adult) (pediatric)    300.02 Stress            F34.1    Dysthymic disorder        ADDENDUMS:      ____________________________________    Addendum: 01/09/2019 12:00 PM - Tracey Meléndez         Visit Note Faxed to:        [facility] ; Number [phone]

## 2021-05-18 NOTE — PROGRESS NOTES
Obed Gil 1985     Office/Outpatient Visit    Visit Date: Thu, Feb 8, 2018 01:35 pm    Provider: Mehreen Can N.P. (Assistant: Leslie Gamboa MA)    Location: [facility]        Electronically signed by Mehreen Can N.P. on  02/12/2018 09:41:01 AM                             SUBJECTIVE:        CC:     Mr. Gil is a 32 year old Black or  male.  Patient complains of persistent cough, seems to think its a reaction from Lisinopril.          HPI:         Cough noted.  Pt states cough x several weeks. He stopped his lisiinopril and states cough got better. States now with cough returning. States some congestion, sore throat, fever x 1 day.          Additionally, he presents with history of hTN.  bP running high d/t not taking meds. He has stopped all meds d/t cough as noted above.      ROS:     CONSTITUTIONAL:  Negative for chills, fatigue, fever and weight change.      CARDIOVASCULAR:  Negative for chest pain, orthopnea, paroxysmal nocturnal dyspnea and pedal edema.      RESPIRATORY:  Positive for recent cough.   Negative for dyspnea or cough.      GASTROINTESTINAL:  Negative for abdominal pain, heartburn, constipation, diarrhea, and stool changes.      NEUROLOGICAL:  Negative for dizziness, headaches, paresthesias, and weakness.      PSYCHIATRIC:  Negative for anxiety and depression.          PMH/FMH/SH:     Last Reviewed on 2/08/2018 02:20 PM by Mehreen Can    Past Medical History:         Positive for    Hypertension: dx'd at age 17; ;         Surgical History:         Fracture Repair: right wrist; at age 19;         Family History:     Father: Hypertension;  Type 2 Diabetes     Mother: Hypertension;  Type 2 Diabetes     Brother(s): Healthy; 1 brother(s) total     Sister(s): Healthy; 1 sister(s) total     Daughter(s): Healthy; 2 daughter(s) total         Social History:     Occupation: [employer], [employer]     Marital Status: Single      Children: 2 children         Tobacco/Alcohol/Supplements:     Last Reviewed on 2/08/2018 02:20 PM by Mehreen Can    Tobacco: He has never smoked.          Alcohol: Frequency: Socially When he drinks, the average quantity of alcohol is 2 drinks.          Substance Abuse History:     Last Reviewed on 2/08/2018 02:20 PM by Mehreen Can    NEGATIVE         Mental Health History:     Last Reviewed on 2/08/2018 02:20 PM by Mehreen Can    NEGATIVE         Communicable Diseases (eg STDs):     Last Reviewed on 2/08/2018 02:20 PM by Mehreen Can            Current Problems:     Last Reviewed on 2/08/2018 02:20 PM by Mehreen Can    High-risk sexual behavior     Hypertension     Screening for hyperlipidemia     High blood pressure     ADD     Obstructive sleep apnea     Screening for diabetes mellitus     Hand pain     Chlamydial infection, unspecified, in condition classified elsewhere     HTN         Immunizations:     None        Allergies:     Last Reviewed on 2/08/2018 02:20 PM by Mehreen Can      No Known Drug Allergies.         Current Medications:     Last Reviewed on 2/08/2018 02:20 PM by Mehreen Can    Lisinopril 20mg Tablet Take 1 tablet(s) by mouth daily     Carvedilol 12.5mg Tablet 1 tab bid     Chlorthalidone 25mg Tablet Take 1 tablet(s) by mouth daily         OBJECTIVE:        Vitals:         Current: 2/8/2018 1:38:00 PM    Ht:  5 ft, 10.5 in;  Wt: 241.1 lbs;  BMI: 34.1    T: 97.3 F (oral);  BP: 167/105 mm Hg (left arm, sitting);  P: 67 bpm (left arm (BP Cuff), sitting);  sCr: 1.16 mg/dL;  GFR: 99.16        Repeat:     2:49:54 PM     BP:   170/114mm Hg (left arm, sitting)         Exams:     PHYSICAL EXAM:     GENERAL: Vitals recorded well developed, well nourished;  well groomed;  no apparent distress;     EYES: lids and lacrimal system are normal in appearance; extraocular movements intact; conjunctiva and cornea are normal; PERRLA;     E/N/T: NOSE: nasal mucosa is  erythematous;  OROPHARYNX: oral mucosa reveals erythema;     NECK:  supple, full ROM; no thyromegaly; no carotid bruits;     RESPIRATORY: normal respiratory rate and pattern with no distress; normal breath sounds with no rales, rhonchi, wheezes or rubs;     CARDIOVASCULAR: normal rate; rhythm is regular;  normal S1; normal S2; no systolic murmur; no cyanosis; no edema;     GASTROINTESTINAL: nontender, nondistended; no hepatosplenomegaly or masses; no bruits;     SKIN:  no significant rashes or lesions; no suspicious moles;     NEUROLOGICAL:  cranial nerves, motor and sensory function, reflexes, gait and coordination are all intact;     PSYCHIATRIC:  appropriate affect and demeanor; normal speech pattern; grossly normal memory;         ASSESSMENT:           786.2   R05  Cough              DDx:     401.1   I10  HTN              DDx:         ORDERS:         Meds Prescribed:       Bromfed-DM (Brompheniramine/Dextromethorphan/Pseudoephedrine) Syrup 1  to 2  tsp po every 4-6 hrs prn cough/congestion.  #240 ([place]) ml Refills: 0       Refill of: Chlorthalidone 25mg Tablet Take 1 tablet(s) by mouth daily  #30 (Thirty) tablet(s) Refills: 1       Amlodipine  2.5mg Tablet 1 tab daily  #30 (Thirty) tablet(s) Refills: 1                 PLAN:          Cough           Prescriptions:       Bromfed-DM (Brompheniramine/Dextromethorphan/Pseudoephedrine) Syrup 1  to 2  tsp po every 4-6 hrs prn cough/congestion.  #240 ([place]) ml Refills: 0          HTN         RECOMMENDATIONS given include: keep blood pressure log as directed.  Return log next week.     FOLLOW-UP: Schedule a follow-up visit in 1 month..   Chronic visit follow up           Prescriptions:       Refill of: Chlorthalidone 25mg Tablet Take 1 tablet(s) by mouth daily  #30 (Thirty) tablet(s) Refills: 1       Amlodipine  2.5mg Tablet 1 tab daily  #30 (Thirty) tablet(s) Refills: 1             Patient Recommendations:        For  HTN:     Keep a  daily blood pressure log and report elevated blood pressure to provider as directed.  Schedule a follow-up visit in 1 month.                APPOINTMENT INFORMATION:        Monday Tuesday Wednesday Thursday Friday Saturday Sunday            Time:___________________AM  PM   Date:_____________________             CHARGE CAPTURE:           Primary Diagnosis:     786.2 Cough            R05    Cough              Orders:          51991   Office/outpatient visit; established patient, level 3  (In-House)           401.1 HTN            I10    Essential (primary) hypertension

## 2021-07-01 VITALS
SYSTOLIC BLOOD PRESSURE: 170 MMHG | BODY MASS INDEX: 33.75 KG/M2 | HEIGHT: 71 IN | TEMPERATURE: 97.3 F | HEART RATE: 67 BPM | WEIGHT: 241.1 LBS | DIASTOLIC BLOOD PRESSURE: 114 MMHG

## 2021-07-01 VITALS
HEIGHT: 71 IN | TEMPERATURE: 97.8 F | BODY MASS INDEX: 35.22 KG/M2 | WEIGHT: 251.6 LBS | DIASTOLIC BLOOD PRESSURE: 131 MMHG | SYSTOLIC BLOOD PRESSURE: 207 MMHG | HEART RATE: 80 BPM

## 2021-07-01 VITALS
HEIGHT: 71 IN | DIASTOLIC BLOOD PRESSURE: 98 MMHG | WEIGHT: 254.6 LBS | HEART RATE: 92 BPM | BODY MASS INDEX: 35.64 KG/M2 | TEMPERATURE: 98.2 F | SYSTOLIC BLOOD PRESSURE: 156 MMHG

## 2021-07-01 VITALS
HEART RATE: 75 BPM | HEIGHT: 71 IN | TEMPERATURE: 97.3 F | SYSTOLIC BLOOD PRESSURE: 154 MMHG | WEIGHT: 249.5 LBS | BODY MASS INDEX: 34.93 KG/M2 | DIASTOLIC BLOOD PRESSURE: 96 MMHG

## 2021-07-01 VITALS
BODY MASS INDEX: 36.29 KG/M2 | TEMPERATURE: 98.1 F | WEIGHT: 259.2 LBS | SYSTOLIC BLOOD PRESSURE: 196 MMHG | DIASTOLIC BLOOD PRESSURE: 122 MMHG | HEART RATE: 79 BPM | HEIGHT: 71 IN

## 2021-07-01 VITALS
TEMPERATURE: 97.7 F | HEIGHT: 71 IN | WEIGHT: 240 LBS | SYSTOLIC BLOOD PRESSURE: 149 MMHG | HEART RATE: 71 BPM | BODY MASS INDEX: 33.6 KG/M2 | DIASTOLIC BLOOD PRESSURE: 95 MMHG

## 2021-07-02 VITALS
TEMPERATURE: 98 F | HEIGHT: 71 IN | BODY MASS INDEX: 35.08 KG/M2 | WEIGHT: 250.6 LBS | HEART RATE: 77 BPM | SYSTOLIC BLOOD PRESSURE: 194 MMHG | DIASTOLIC BLOOD PRESSURE: 118 MMHG